# Patient Record
Sex: MALE
[De-identification: names, ages, dates, MRNs, and addresses within clinical notes are randomized per-mention and may not be internally consistent; named-entity substitution may affect disease eponyms.]

---

## 2020-03-16 ENCOUNTER — NURSE TRIAGE (OUTPATIENT)
Dept: OTHER | Facility: CLINIC | Age: 55
End: 2020-03-16

## 2020-05-02 ENCOUNTER — NURSE TRIAGE (OUTPATIENT)
Dept: OTHER | Facility: CLINIC | Age: 55
End: 2020-05-02

## 2020-05-02 NOTE — TELEPHONE ENCOUNTER
Reason for Disposition   [1] Fever AND [2] no signs of serious infection or localizing symptoms (all other triage questions negative)   [1] MILD weakness (i.e., does not interfere with ability to work, go to school, normal activities) AND [2] persists > 1 week    Protocols used: FEVER-ADULT-AH, WEAKNESS (GENERALIZED) AND FATIGUE-ADULT-AH    Pt with fatige that started yesterday, very tired 100.5, sweating and chills, nausea, urine is dark, pt is still drinking. Call routed to this RN from MMO line. See pt triage notes above. Per protocol pt to be seen by PCP or other provider within 72 hours, pt plans to comply. Pt plans to used [email or web address], aware to call back if unable to do so or he gets worse. Please do not respond to the triage nurse through this encounter. Any subsequent communication needs to be directly with the patient.